# Patient Record
Sex: FEMALE
[De-identification: names, ages, dates, MRNs, and addresses within clinical notes are randomized per-mention and may not be internally consistent; named-entity substitution may affect disease eponyms.]

---

## 2018-11-06 PROBLEM — Z00.00 ENCOUNTER FOR PREVENTIVE HEALTH EXAMINATION: Status: ACTIVE | Noted: 2018-11-06

## 2018-11-08 ENCOUNTER — APPOINTMENT (OUTPATIENT)
Dept: UROLOGY | Facility: CLINIC | Age: 83
End: 2018-11-08
Payer: MEDICARE

## 2018-11-08 VITALS
TEMPERATURE: 98.1 F | WEIGHT: 150 LBS | HEART RATE: 76 BPM | HEIGHT: 63 IN | RESPIRATION RATE: 17 BRPM | SYSTOLIC BLOOD PRESSURE: 190 MMHG | DIASTOLIC BLOOD PRESSURE: 89 MMHG | BODY MASS INDEX: 26.58 KG/M2

## 2018-11-08 DIAGNOSIS — I63.9 CEREBRAL INFARCTION, UNSPECIFIED: ICD-10-CM

## 2018-11-08 DIAGNOSIS — Z80.8 FAMILY HISTORY OF MALIGNANT NEOPLASM OF OTHER ORGANS OR SYSTEMS: ICD-10-CM

## 2018-11-08 DIAGNOSIS — Z85.3 PERSONAL HISTORY OF MALIGNANT NEOPLASM OF BREAST: ICD-10-CM

## 2018-11-08 DIAGNOSIS — Z85.51 PERSONAL HISTORY OF MALIGNANT NEOPLASM OF BLADDER: ICD-10-CM

## 2018-11-08 DIAGNOSIS — Z86.73 PERSONAL HISTORY OF TRANSIENT ISCHEMIC ATTACK (TIA), AND CEREBRAL INFARCTION W/OUT RESIDUAL DEFICITS: ICD-10-CM

## 2018-11-08 DIAGNOSIS — Z80.52 FAMILY HISTORY OF MALIGNANT NEOPLASM OF BLADDER: ICD-10-CM

## 2018-11-08 DIAGNOSIS — Z78.9 OTHER SPECIFIED HEALTH STATUS: ICD-10-CM

## 2018-11-08 DIAGNOSIS — Z80.3 FAMILY HISTORY OF MALIGNANT NEOPLASM OF BREAST: ICD-10-CM

## 2018-11-08 PROCEDURE — 99204 OFFICE O/P NEW MOD 45 MIN: CPT | Mod: 25

## 2018-11-08 PROCEDURE — 51798 US URINE CAPACITY MEASURE: CPT

## 2018-11-09 LAB
APPEARANCE: CLEAR
BACTERIA: ABNORMAL
BILIRUBIN URINE: NEGATIVE
BLOOD URINE: ABNORMAL
COLOR: YELLOW
GLUCOSE QUALITATIVE U: NEGATIVE MG/DL
HYALINE CASTS: 3 /LPF
KETONES URINE: NEGATIVE
LEUKOCYTE ESTERASE URINE: ABNORMAL
MICROSCOPIC-UA: NORMAL
NITRITE URINE: NEGATIVE
PH URINE: 6.5
PROTEIN URINE: 30 MG/DL
RED BLOOD CELLS URINE: 1 /HPF
SPECIFIC GRAVITY URINE: 1.01
SQUAMOUS EPITHELIAL CELLS: 1 /HPF
UROBILINOGEN URINE: NEGATIVE MG/DL
WHITE BLOOD CELLS URINE: 108 /HPF

## 2018-11-12 LAB — BACTERIA UR CULT: ABNORMAL

## 2018-12-17 ENCOUNTER — APPOINTMENT (OUTPATIENT)
Dept: UROLOGY | Facility: CLINIC | Age: 83
End: 2018-12-17

## 2019-01-09 ENCOUNTER — APPOINTMENT (OUTPATIENT)
Dept: UROLOGY | Facility: CLINIC | Age: 84
End: 2019-01-09
Payer: MEDICARE

## 2019-01-09 ENCOUNTER — OUTPATIENT (OUTPATIENT)
Dept: OUTPATIENT SERVICES | Facility: HOSPITAL | Age: 84
LOS: 1 days | End: 2019-01-09
Payer: MEDICARE

## 2019-01-09 VITALS
SYSTOLIC BLOOD PRESSURE: 187 MMHG | BODY MASS INDEX: 28.35 KG/M2 | DIASTOLIC BLOOD PRESSURE: 88 MMHG | HEART RATE: 83 BPM | HEIGHT: 63 IN | WEIGHT: 160 LBS | RESPIRATION RATE: 83 BRPM

## 2019-01-09 DIAGNOSIS — R35.0 FREQUENCY OF MICTURITION: ICD-10-CM

## 2019-01-09 PROCEDURE — 52000 CYSTOURETHROSCOPY: CPT

## 2019-01-09 PROCEDURE — 99214 OFFICE O/P EST MOD 30 MIN: CPT | Mod: 25

## 2019-01-09 PROCEDURE — 51700 IRRIGATION OF BLADDER: CPT | Mod: 59

## 2019-01-14 LAB — BACTERIA UR CULT: ABNORMAL

## 2019-01-14 RX ORDER — TOLTERODINE TARTRATE 2 MG/1
2 CAPSULE, EXTENDED RELEASE ORAL
Qty: 60 | Refills: 4 | Status: COMPLETED | COMMUNITY
Start: 2019-01-09 | End: 2019-01-14

## 2019-01-18 DIAGNOSIS — C67.9 MALIGNANT NEOPLASM OF BLADDER, UNSPECIFIED: ICD-10-CM

## 2019-01-22 LAB
APPEARANCE: ABNORMAL
BACTERIA: ABNORMAL
BILIRUBIN URINE: NEGATIVE
BLOOD URINE: ABNORMAL
COLOR: YELLOW
GLUCOSE QUALITATIVE U: NEGATIVE MG/DL
HYALINE CASTS: 2 /LPF
KETONES URINE: NEGATIVE
LEUKOCYTE ESTERASE URINE: ABNORMAL
MICROSCOPIC-UA: NORMAL
NITRITE URINE: POSITIVE
PH URINE: 6
PROTEIN URINE: NEGATIVE MG/DL
RED BLOOD CELLS URINE: 50 /HPF
SPECIFIC GRAVITY URINE: 1.01
SQUAMOUS EPITHELIAL CELLS: 1 /HPF
UROBILINOGEN URINE: NEGATIVE MG/DL
WHITE BLOOD CELLS URINE: 333 /HPF

## 2019-01-23 ENCOUNTER — MEDICATION RENEWAL (OUTPATIENT)
Age: 84
End: 2019-01-23

## 2019-01-23 ENCOUNTER — APPOINTMENT (OUTPATIENT)
Dept: UROLOGY | Facility: CLINIC | Age: 84
End: 2019-01-23
Payer: MEDICARE

## 2019-01-23 ENCOUNTER — OUTPATIENT (OUTPATIENT)
Dept: OUTPATIENT SERVICES | Facility: HOSPITAL | Age: 84
LOS: 1 days | End: 2019-01-23
Payer: MEDICARE

## 2019-01-23 VITALS
HEART RATE: 66 BPM | HEIGHT: 63 IN | BODY MASS INDEX: 28.35 KG/M2 | WEIGHT: 160 LBS | TEMPERATURE: 97.6 F | SYSTOLIC BLOOD PRESSURE: 178 MMHG | RESPIRATION RATE: 17 BRPM | DIASTOLIC BLOOD PRESSURE: 75 MMHG

## 2019-01-23 VITALS — RESPIRATION RATE: 17 BRPM | HEART RATE: 72 BPM | DIASTOLIC BLOOD PRESSURE: 81 MMHG | SYSTOLIC BLOOD PRESSURE: 177 MMHG

## 2019-01-23 DIAGNOSIS — R35.0 FREQUENCY OF MICTURITION: ICD-10-CM

## 2019-01-23 PROCEDURE — 51700 IRRIGATION OF BLADDER: CPT

## 2019-01-24 DIAGNOSIS — C67.9 MALIGNANT NEOPLASM OF BLADDER, UNSPECIFIED: ICD-10-CM

## 2019-01-30 ENCOUNTER — APPOINTMENT (OUTPATIENT)
Dept: UROLOGY | Facility: CLINIC | Age: 84
End: 2019-01-30
Payer: MEDICARE

## 2019-01-30 ENCOUNTER — MEDICATION RENEWAL (OUTPATIENT)
Age: 84
End: 2019-01-30

## 2019-01-30 ENCOUNTER — OUTPATIENT (OUTPATIENT)
Dept: OUTPATIENT SERVICES | Facility: HOSPITAL | Age: 84
LOS: 1 days | End: 2019-01-30
Payer: MEDICARE

## 2019-01-30 VITALS — HEART RATE: 69 BPM | TEMPERATURE: 97.8 F | SYSTOLIC BLOOD PRESSURE: 180 MMHG | DIASTOLIC BLOOD PRESSURE: 90 MMHG

## 2019-01-30 DIAGNOSIS — R35.0 FREQUENCY OF MICTURITION: ICD-10-CM

## 2019-01-30 PROCEDURE — 51700 IRRIGATION OF BLADDER: CPT

## 2019-01-31 LAB
APPEARANCE: CLEAR
BACTERIA: NEGATIVE
BILIRUBIN URINE: NEGATIVE
BLOOD URINE: NEGATIVE
COLOR: YELLOW
GLUCOSE QUALITATIVE U: NEGATIVE MG/DL
HYALINE CASTS: 1 /LPF
KETONES URINE: NEGATIVE
LEUKOCYTE ESTERASE URINE: NEGATIVE
MICROSCOPIC-UA: NORMAL
NITRITE URINE: NEGATIVE
PH URINE: 6.5
PROTEIN URINE: NEGATIVE MG/DL
RED BLOOD CELLS URINE: 0 /HPF
SPECIFIC GRAVITY URINE: 1.01
SQUAMOUS EPITHELIAL CELLS: 1 /HPF
UROBILINOGEN URINE: NEGATIVE MG/DL
WHITE BLOOD CELLS URINE: 0 /HPF

## 2019-02-01 DIAGNOSIS — N39.0 URINARY TRACT INFECTION, SITE NOT SPECIFIED: ICD-10-CM

## 2019-02-01 DIAGNOSIS — C67.9 MALIGNANT NEOPLASM OF BLADDER, UNSPECIFIED: ICD-10-CM

## 2019-02-01 LAB — BACTERIA UR CULT: NORMAL

## 2019-02-06 ENCOUNTER — MEDICATION RENEWAL (OUTPATIENT)
Age: 84
End: 2019-02-06

## 2019-02-06 ENCOUNTER — APPOINTMENT (OUTPATIENT)
Dept: UROLOGY | Facility: CLINIC | Age: 84
End: 2019-02-06
Payer: MEDICARE

## 2019-02-06 ENCOUNTER — OUTPATIENT (OUTPATIENT)
Dept: OUTPATIENT SERVICES | Facility: HOSPITAL | Age: 84
LOS: 1 days | End: 2019-02-06
Payer: MEDICARE

## 2019-02-06 VITALS
SYSTOLIC BLOOD PRESSURE: 129 MMHG | RESPIRATION RATE: 17 BRPM | TEMPERATURE: 98 F | HEART RATE: 70 BPM | DIASTOLIC BLOOD PRESSURE: 71 MMHG

## 2019-02-06 VITALS
TEMPERATURE: 98 F | DIASTOLIC BLOOD PRESSURE: 81 MMHG | HEART RATE: 70 BPM | SYSTOLIC BLOOD PRESSURE: 186 MMHG | RESPIRATION RATE: 17 BRPM

## 2019-02-06 DIAGNOSIS — R35.0 FREQUENCY OF MICTURITION: ICD-10-CM

## 2019-02-06 PROCEDURE — 99211 OFF/OP EST MAY X REQ PHY/QHP: CPT | Mod: 25

## 2019-02-06 PROCEDURE — 51700 IRRIGATION OF BLADDER: CPT

## 2019-02-06 RX ORDER — METHENAMINE, SODIUM PHOSPHATE, MONOBASIC, MONOHYDRATE, PHENYL SALICYLATE, METHYLENE BLUE, AND HYOSCYAMINE SULFATE 118; 40.8; 36; 10; .12 MG/1; MG/1; MG/1; MG/1; MG/1
118 CAPSULE ORAL
Qty: 120 | Refills: 2 | Status: DISCONTINUED | COMMUNITY
Start: 2018-11-08 | End: 2019-02-06

## 2019-02-06 RX ORDER — OXYBUTYNIN CHLORIDE 5 MG/1
5 TABLET, EXTENDED RELEASE ORAL DAILY
Qty: 30 | Refills: 1 | Status: DISCONTINUED | COMMUNITY
Start: 2019-01-10 | End: 2019-02-06

## 2019-02-07 DIAGNOSIS — C67.9 MALIGNANT NEOPLASM OF BLADDER, UNSPECIFIED: ICD-10-CM

## 2019-02-07 DIAGNOSIS — N39.41 URGE INCONTINENCE: ICD-10-CM

## 2019-02-07 LAB
ALBUMIN SERPL ELPH-MCNC: 4.3 G/DL
ALP BLD-CCNC: 116 U/L
ALT SERPL-CCNC: 19 U/L
ANION GAP SERPL CALC-SCNC: 17 MMOL/L
AST SERPL-CCNC: 21 U/L
BILIRUB SERPL-MCNC: 0.8 MG/DL
BUN SERPL-MCNC: 27 MG/DL
CALCIUM SERPL-MCNC: 10.3 MG/DL
CHLORIDE SERPL-SCNC: 101 MMOL/L
CO2 SERPL-SCNC: 28 MMOL/L
CREAT SERPL-MCNC: 1.66 MG/DL
GLUCOSE SERPL-MCNC: 127 MG/DL
POTASSIUM SERPL-SCNC: 4.2 MMOL/L
PROT SERPL-MCNC: 6.9 G/DL
SODIUM SERPL-SCNC: 146 MMOL/L

## 2019-02-11 ENCOUNTER — APPOINTMENT (OUTPATIENT)
Dept: UROLOGY | Facility: CLINIC | Age: 84
End: 2019-02-11
Payer: MEDICARE

## 2019-02-11 ENCOUNTER — OUTPATIENT (OUTPATIENT)
Dept: OUTPATIENT SERVICES | Facility: HOSPITAL | Age: 84
LOS: 1 days | End: 2019-02-11
Payer: MEDICARE

## 2019-02-11 VITALS
BODY MASS INDEX: 28.35 KG/M2 | RESPIRATION RATE: 17 BRPM | HEART RATE: 79 BPM | TEMPERATURE: 98.4 F | SYSTOLIC BLOOD PRESSURE: 169 MMHG | WEIGHT: 160 LBS | HEIGHT: 63 IN | DIASTOLIC BLOOD PRESSURE: 80 MMHG

## 2019-02-11 DIAGNOSIS — R35.0 FREQUENCY OF MICTURITION: ICD-10-CM

## 2019-02-11 PROCEDURE — 99211 OFF/OP EST MAY X REQ PHY/QHP: CPT | Mod: 25

## 2019-02-11 PROCEDURE — 51700 IRRIGATION OF BLADDER: CPT

## 2019-02-12 DIAGNOSIS — N39.41 URGE INCONTINENCE: ICD-10-CM

## 2019-02-12 DIAGNOSIS — C67.9 MALIGNANT NEOPLASM OF BLADDER, UNSPECIFIED: ICD-10-CM

## 2019-02-12 LAB
APPEARANCE: CLEAR
BACTERIA: NEGATIVE
BILIRUBIN URINE: NEGATIVE
BLOOD URINE: NEGATIVE
COLOR: ABNORMAL
GLUCOSE QUALITATIVE U: NEGATIVE MG/DL
HYALINE CASTS: 3 /LPF
KETONES URINE: NEGATIVE
LEUKOCYTE ESTERASE URINE: ABNORMAL
MICROSCOPIC-UA: NORMAL
NITRITE URINE: POSITIVE
PH URINE: 7
PROTEIN URINE: NEGATIVE MG/DL
RED BLOOD CELLS URINE: 3 /HPF
SPECIFIC GRAVITY URINE: 1.01
SQUAMOUS EPITHELIAL CELLS: 4 /HPF
UROBILINOGEN URINE: 1 MG/DL
WHITE BLOOD CELLS URINE: 11 /HPF

## 2019-02-14 LAB — BACTERIA UR CULT: ABNORMAL

## 2019-02-20 ENCOUNTER — OUTPATIENT (OUTPATIENT)
Dept: OUTPATIENT SERVICES | Facility: HOSPITAL | Age: 84
LOS: 1 days | End: 2019-02-20
Payer: MEDICARE

## 2019-02-20 ENCOUNTER — APPOINTMENT (OUTPATIENT)
Dept: UROLOGY | Facility: CLINIC | Age: 84
End: 2019-02-20
Payer: MEDICARE

## 2019-02-20 VITALS
DIASTOLIC BLOOD PRESSURE: 68 MMHG | TEMPERATURE: 98.1 F | SYSTOLIC BLOOD PRESSURE: 182 MMHG | RESPIRATION RATE: 16 BRPM | HEART RATE: 79 BPM

## 2019-02-20 VITALS — DIASTOLIC BLOOD PRESSURE: 82 MMHG | SYSTOLIC BLOOD PRESSURE: 189 MMHG | HEART RATE: 82 BPM | RESPIRATION RATE: 16 BRPM

## 2019-02-20 DIAGNOSIS — R35.0 FREQUENCY OF MICTURITION: ICD-10-CM

## 2019-02-20 DIAGNOSIS — N39.41 URGE INCONTINENCE: ICD-10-CM

## 2019-02-20 DIAGNOSIS — C67.9 MALIGNANT NEOPLASM OF BLADDER, UNSPECIFIED: ICD-10-CM

## 2019-02-20 PROCEDURE — 51700 IRRIGATION OF BLADDER: CPT

## 2019-02-27 ENCOUNTER — APPOINTMENT (OUTPATIENT)
Dept: UROLOGY | Facility: CLINIC | Age: 84
End: 2019-02-27
Payer: MEDICARE

## 2019-02-27 ENCOUNTER — OUTPATIENT (OUTPATIENT)
Dept: OUTPATIENT SERVICES | Facility: HOSPITAL | Age: 84
LOS: 1 days | End: 2019-02-27
Payer: MEDICARE

## 2019-02-27 VITALS — DIASTOLIC BLOOD PRESSURE: 73 MMHG | TEMPERATURE: 97.4 F | HEART RATE: 68 BPM | SYSTOLIC BLOOD PRESSURE: 175 MMHG

## 2019-02-27 VITALS
RESPIRATION RATE: 16 BRPM | BODY MASS INDEX: 28.35 KG/M2 | DIASTOLIC BLOOD PRESSURE: 77 MMHG | HEART RATE: 79 BPM | HEIGHT: 63 IN | TEMPERATURE: 97.7 F | WEIGHT: 160 LBS | SYSTOLIC BLOOD PRESSURE: 186 MMHG

## 2019-02-27 DIAGNOSIS — R35.0 FREQUENCY OF MICTURITION: ICD-10-CM

## 2019-02-27 PROCEDURE — 51700 IRRIGATION OF BLADDER: CPT

## 2019-02-28 LAB
APPEARANCE: CLEAR
BACTERIA: NEGATIVE
BILIRUBIN URINE: NEGATIVE
BLOOD URINE: NEGATIVE
COLOR: COLORLESS
GLUCOSE QUALITATIVE U: NEGATIVE
HYALINE CASTS: 0 /LPF
KETONES URINE: NEGATIVE
LEUKOCYTE ESTERASE URINE: NEGATIVE
MICROSCOPIC-UA: NORMAL
NITRITE URINE: NEGATIVE
PH URINE: 7
PROTEIN URINE: NEGATIVE
RED BLOOD CELLS URINE: 0 /HPF
SPECIFIC GRAVITY URINE: 1.01
SQUAMOUS EPITHELIAL CELLS: 1 /HPF
UROBILINOGEN URINE: NORMAL
WHITE BLOOD CELLS URINE: 0 /HPF

## 2019-03-01 DIAGNOSIS — N39.0 URINARY TRACT INFECTION, SITE NOT SPECIFIED: ICD-10-CM

## 2019-03-01 LAB — BACTERIA UR CULT: NORMAL

## 2019-03-04 ENCOUNTER — APPOINTMENT (OUTPATIENT)
Dept: UROLOGY | Facility: CLINIC | Age: 84
End: 2019-03-04
Payer: MEDICARE

## 2019-03-04 ENCOUNTER — OUTPATIENT (OUTPATIENT)
Dept: OUTPATIENT SERVICES | Facility: HOSPITAL | Age: 84
LOS: 1 days | End: 2019-03-04
Payer: MEDICARE

## 2019-03-04 VITALS
SYSTOLIC BLOOD PRESSURE: 174 MMHG | TEMPERATURE: 97.8 F | DIASTOLIC BLOOD PRESSURE: 84 MMHG | RESPIRATION RATE: 16 BRPM | HEART RATE: 76 BPM

## 2019-03-04 VITALS — DIASTOLIC BLOOD PRESSURE: 83 MMHG | HEART RATE: 71 BPM | SYSTOLIC BLOOD PRESSURE: 172 MMHG | RESPIRATION RATE: 16 BRPM

## 2019-03-04 DIAGNOSIS — R35.0 FREQUENCY OF MICTURITION: ICD-10-CM

## 2019-03-04 PROCEDURE — 99213 OFFICE O/P EST LOW 20 MIN: CPT | Mod: 25

## 2019-03-04 PROCEDURE — 51700 IRRIGATION OF BLADDER: CPT

## 2019-03-15 ENCOUNTER — APPOINTMENT (OUTPATIENT)
Dept: UROLOGY | Facility: CLINIC | Age: 84
End: 2019-03-15

## 2019-03-19 DIAGNOSIS — N39.41 URGE INCONTINENCE: ICD-10-CM

## 2019-03-19 DIAGNOSIS — N39.0 URINARY TRACT INFECTION, SITE NOT SPECIFIED: ICD-10-CM

## 2019-03-21 ENCOUNTER — APPOINTMENT (OUTPATIENT)
Dept: UROLOGY | Facility: CLINIC | Age: 84
End: 2019-03-21

## 2019-03-22 ENCOUNTER — APPOINTMENT (OUTPATIENT)
Dept: UROLOGY | Facility: CLINIC | Age: 84
End: 2019-03-22
Payer: MEDICARE

## 2019-03-22 ENCOUNTER — OUTPATIENT (OUTPATIENT)
Dept: OUTPATIENT SERVICES | Facility: HOSPITAL | Age: 84
LOS: 1 days | End: 2019-03-22
Payer: MEDICARE

## 2019-03-22 DIAGNOSIS — R35.0 FREQUENCY OF MICTURITION: ICD-10-CM

## 2019-03-22 PROCEDURE — 51797 INTRAABDOMINAL PRESSURE TEST: CPT

## 2019-03-22 PROCEDURE — 51728 CYSTOMETROGRAM W/VP: CPT | Mod: 26

## 2019-03-22 PROCEDURE — 99214 OFFICE O/P EST MOD 30 MIN: CPT | Mod: 25

## 2019-03-22 PROCEDURE — 51797 INTRAABDOMINAL PRESSURE TEST: CPT | Mod: 26

## 2019-03-22 PROCEDURE — 51728 CYSTOMETROGRAM W/VP: CPT

## 2019-03-22 PROCEDURE — 51784 ANAL/URINARY MUSCLE STUDY: CPT | Mod: 26

## 2019-03-22 PROCEDURE — 51784 ANAL/URINARY MUSCLE STUDY: CPT

## 2019-03-22 PROCEDURE — 51798 US URINE CAPACITY MEASURE: CPT | Mod: 59

## 2019-03-22 PROCEDURE — 51741 ELECTRO-UROFLOWMETRY FIRST: CPT | Mod: 26

## 2019-03-22 PROCEDURE — 51741 ELECTRO-UROFLOWMETRY FIRST: CPT

## 2019-03-27 DIAGNOSIS — N39.41 URGE INCONTINENCE: ICD-10-CM

## 2019-03-27 DIAGNOSIS — C67.9 MALIGNANT NEOPLASM OF BLADDER, UNSPECIFIED: ICD-10-CM

## 2019-04-03 ENCOUNTER — OUTPATIENT (OUTPATIENT)
Dept: OUTPATIENT SERVICES | Facility: HOSPITAL | Age: 84
LOS: 1 days | End: 2019-04-03
Payer: MEDICARE

## 2019-04-03 ENCOUNTER — APPOINTMENT (OUTPATIENT)
Dept: UROLOGY | Facility: CLINIC | Age: 84
End: 2019-04-03
Payer: MEDICARE

## 2019-04-03 VITALS — SYSTOLIC BLOOD PRESSURE: 165 MMHG | DIASTOLIC BLOOD PRESSURE: 79 MMHG | HEART RATE: 85 BPM

## 2019-04-03 VITALS — SYSTOLIC BLOOD PRESSURE: 174 MMHG | HEART RATE: 78 BPM | TEMPERATURE: 97.4 F | DIASTOLIC BLOOD PRESSURE: 79 MMHG

## 2019-04-03 DIAGNOSIS — R35.0 FREQUENCY OF MICTURITION: ICD-10-CM

## 2019-04-03 PROCEDURE — 51700 IRRIGATION OF BLADDER: CPT

## 2019-04-05 DIAGNOSIS — C67.9 MALIGNANT NEOPLASM OF BLADDER, UNSPECIFIED: ICD-10-CM

## 2019-04-11 ENCOUNTER — OUTPATIENT (OUTPATIENT)
Dept: OUTPATIENT SERVICES | Facility: HOSPITAL | Age: 84
LOS: 1 days | End: 2019-04-11
Payer: MEDICARE

## 2019-04-11 ENCOUNTER — APPOINTMENT (OUTPATIENT)
Dept: UROLOGY | Facility: CLINIC | Age: 84
End: 2019-04-11
Payer: MEDICARE

## 2019-04-11 ENCOUNTER — MEDICATION RENEWAL (OUTPATIENT)
Age: 84
End: 2019-04-11

## 2019-04-11 VITALS — SYSTOLIC BLOOD PRESSURE: 148 MMHG | HEART RATE: 85 BPM | RESPIRATION RATE: 16 BRPM | DIASTOLIC BLOOD PRESSURE: 73 MMHG

## 2019-04-11 VITALS
TEMPERATURE: 97.8 F | SYSTOLIC BLOOD PRESSURE: 146 MMHG | HEART RATE: 89 BPM | RESPIRATION RATE: 16 BRPM | DIASTOLIC BLOOD PRESSURE: 75 MMHG

## 2019-04-11 DIAGNOSIS — R35.0 FREQUENCY OF MICTURITION: ICD-10-CM

## 2019-04-11 PROCEDURE — 51700 IRRIGATION OF BLADDER: CPT

## 2019-04-17 ENCOUNTER — MEDICATION RENEWAL (OUTPATIENT)
Age: 84
End: 2019-04-17

## 2019-04-17 ENCOUNTER — APPOINTMENT (OUTPATIENT)
Dept: UROLOGY | Facility: CLINIC | Age: 84
End: 2019-04-17
Payer: MEDICARE

## 2019-04-17 ENCOUNTER — OUTPATIENT (OUTPATIENT)
Dept: OUTPATIENT SERVICES | Facility: HOSPITAL | Age: 84
LOS: 1 days | End: 2019-04-17
Payer: MEDICARE

## 2019-04-17 VITALS
RESPIRATION RATE: 16 BRPM | WEIGHT: 160 LBS | DIASTOLIC BLOOD PRESSURE: 78 MMHG | SYSTOLIC BLOOD PRESSURE: 168 MMHG | BODY MASS INDEX: 28.35 KG/M2 | HEART RATE: 83 BPM | HEIGHT: 63 IN

## 2019-04-17 VITALS
DIASTOLIC BLOOD PRESSURE: 79 MMHG | TEMPERATURE: 97.1 F | RESPIRATION RATE: 16 BRPM | HEART RATE: 71 BPM | SYSTOLIC BLOOD PRESSURE: 175 MMHG

## 2019-04-17 DIAGNOSIS — R35.0 FREQUENCY OF MICTURITION: ICD-10-CM

## 2019-04-17 PROCEDURE — 99213 OFFICE O/P EST LOW 20 MIN: CPT | Mod: 25

## 2019-04-17 PROCEDURE — 51700 IRRIGATION OF BLADDER: CPT

## 2019-04-18 DIAGNOSIS — N39.41 URGE INCONTINENCE: ICD-10-CM

## 2019-04-18 LAB
APPEARANCE: CLEAR
BACTERIA: ABNORMAL
BILIRUBIN URINE: NEGATIVE
BLOOD URINE: NEGATIVE
COLOR: COLORLESS
GLUCOSE QUALITATIVE U: NEGATIVE
HYALINE CASTS: 0 /LPF
KETONES URINE: NEGATIVE
LEUKOCYTE ESTERASE URINE: ABNORMAL
MICROSCOPIC-UA: NORMAL
NITRITE URINE: NEGATIVE
PH URINE: 7
PROTEIN URINE: NORMAL
RED BLOOD CELLS URINE: 3 /HPF
SPECIFIC GRAVITY URINE: 1.01
SQUAMOUS EPITHELIAL CELLS: 1 /HPF
UROBILINOGEN URINE: NORMAL
WHITE BLOOD CELLS URINE: 50 /HPF

## 2019-04-22 ENCOUNTER — APPOINTMENT (OUTPATIENT)
Dept: UROLOGY | Facility: CLINIC | Age: 84
End: 2019-04-22
Payer: MEDICARE

## 2019-04-22 ENCOUNTER — MEDICATION RENEWAL (OUTPATIENT)
Age: 84
End: 2019-04-22

## 2019-04-22 ENCOUNTER — OUTPATIENT (OUTPATIENT)
Dept: OUTPATIENT SERVICES | Facility: HOSPITAL | Age: 84
LOS: 1 days | End: 2019-04-22
Payer: MEDICARE

## 2019-04-22 VITALS — TEMPERATURE: 97.7 F | SYSTOLIC BLOOD PRESSURE: 169 MMHG | DIASTOLIC BLOOD PRESSURE: 76 MMHG | HEART RATE: 86 BPM

## 2019-04-22 VITALS — DIASTOLIC BLOOD PRESSURE: 78 MMHG | HEART RATE: 86 BPM | SYSTOLIC BLOOD PRESSURE: 158 MMHG | RESPIRATION RATE: 16 BRPM

## 2019-04-22 DIAGNOSIS — R35.0 FREQUENCY OF MICTURITION: ICD-10-CM

## 2019-04-22 LAB — BACTERIA UR CULT: ABNORMAL

## 2019-04-22 PROCEDURE — 51700 IRRIGATION OF BLADDER: CPT

## 2019-04-24 DIAGNOSIS — N39.0 URINARY TRACT INFECTION, SITE NOT SPECIFIED: ICD-10-CM

## 2019-04-24 DIAGNOSIS — N39.41 URGE INCONTINENCE: ICD-10-CM

## 2019-04-26 DIAGNOSIS — N39.0 URINARY TRACT INFECTION, SITE NOT SPECIFIED: ICD-10-CM

## 2019-04-26 DIAGNOSIS — N39.41 URGE INCONTINENCE: ICD-10-CM

## 2019-05-02 ENCOUNTER — APPOINTMENT (OUTPATIENT)
Dept: UROLOGY | Facility: CLINIC | Age: 84
End: 2019-05-02
Payer: MEDICARE

## 2019-05-02 ENCOUNTER — MEDICATION RENEWAL (OUTPATIENT)
Age: 84
End: 2019-05-02

## 2019-05-02 ENCOUNTER — OUTPATIENT (OUTPATIENT)
Dept: OUTPATIENT SERVICES | Facility: HOSPITAL | Age: 84
LOS: 1 days | End: 2019-05-02
Payer: MEDICARE

## 2019-05-02 VITALS
SYSTOLIC BLOOD PRESSURE: 147 MMHG | TEMPERATURE: 98 F | BODY MASS INDEX: 28.35 KG/M2 | WEIGHT: 160 LBS | HEART RATE: 82 BPM | RESPIRATION RATE: 18 BRPM | DIASTOLIC BLOOD PRESSURE: 76 MMHG | HEIGHT: 63 IN

## 2019-05-02 DIAGNOSIS — R35.0 FREQUENCY OF MICTURITION: ICD-10-CM

## 2019-05-02 PROCEDURE — 51700 IRRIGATION OF BLADDER: CPT

## 2019-05-03 ENCOUNTER — APPOINTMENT (OUTPATIENT)
Dept: UROLOGY | Facility: CLINIC | Age: 84
End: 2019-05-03

## 2019-05-08 ENCOUNTER — APPOINTMENT (OUTPATIENT)
Dept: UROLOGY | Facility: CLINIC | Age: 84
End: 2019-05-08

## 2019-05-08 ENCOUNTER — MEDICATION RENEWAL (OUTPATIENT)
Age: 84
End: 2019-05-08

## 2019-05-08 LAB
ALBUMIN SERPL ELPH-MCNC: 4.1 G/DL
ALP BLD-CCNC: 146 U/L
ALT SERPL-CCNC: 22 U/L
ANION GAP SERPL CALC-SCNC: 12 MMOL/L
AST SERPL-CCNC: 19 U/L
BILIRUB SERPL-MCNC: 0.6 MG/DL
BUN SERPL-MCNC: 37 MG/DL
CALCIUM SERPL-MCNC: 9.6 MG/DL
CHLORIDE SERPL-SCNC: 98 MMOL/L
CO2 SERPL-SCNC: 30 MMOL/L
CREAT SERPL-MCNC: 1.68 MG/DL
GLUCOSE SERPL-MCNC: 178 MG/DL
POTASSIUM SERPL-SCNC: 3.7 MMOL/L
PROT SERPL-MCNC: 6.6 G/DL
SODIUM SERPL-SCNC: 140 MMOL/L

## 2019-06-19 ENCOUNTER — OUTPATIENT (OUTPATIENT)
Dept: OUTPATIENT SERVICES | Facility: HOSPITAL | Age: 84
LOS: 1 days | End: 2019-06-19
Payer: MEDICARE

## 2019-06-19 ENCOUNTER — APPOINTMENT (OUTPATIENT)
Dept: UROLOGY | Facility: CLINIC | Age: 84
End: 2019-06-19
Payer: MEDICARE

## 2019-06-19 VITALS
RESPIRATION RATE: 16 BRPM | DIASTOLIC BLOOD PRESSURE: 71 MMHG | TEMPERATURE: 98.3 F | HEART RATE: 93 BPM | SYSTOLIC BLOOD PRESSURE: 166 MMHG

## 2019-06-19 DIAGNOSIS — R35.0 FREQUENCY OF MICTURITION: ICD-10-CM

## 2019-06-19 PROCEDURE — 52000 CYSTOURETHROSCOPY: CPT

## 2019-06-19 PROCEDURE — 99214 OFFICE O/P EST MOD 30 MIN: CPT | Mod: 25

## 2019-06-19 RX ORDER — CONJUGATED ESTROGENS 0.62 MG/G
0.62 CREAM VAGINAL
Qty: 4 | Refills: 2 | Status: ACTIVE | COMMUNITY
Start: 2019-06-19 | End: 1900-01-01

## 2019-06-19 RX ORDER — NORTRIPTYLINE HYDROCHLORIDE 10 MG/1
10 CAPSULE ORAL
Qty: 180 | Refills: 3 | Status: DISCONTINUED | COMMUNITY
Start: 2019-03-22 | End: 2019-06-19

## 2019-06-20 LAB
APPEARANCE: CLEAR
BACTERIA: NEGATIVE
BILIRUBIN URINE: NEGATIVE
BLOOD URINE: NEGATIVE
COLOR: COLORLESS
GLUCOSE QUALITATIVE U: NEGATIVE
HYALINE CASTS: 0 /LPF
KETONES URINE: NEGATIVE
LEUKOCYTE ESTERASE URINE: ABNORMAL
MICROSCOPIC-UA: NORMAL
NITRITE URINE: NEGATIVE
PH URINE: 6
PROTEIN URINE: NEGATIVE
RED BLOOD CELLS URINE: 4 /HPF
SPECIFIC GRAVITY URINE: 1
SQUAMOUS EPITHELIAL CELLS: 1 /HPF
UROBILINOGEN URINE: NORMAL
WHITE BLOOD CELLS URINE: 47 /HPF

## 2019-06-22 DIAGNOSIS — C67.9 MALIGNANT NEOPLASM OF BLADDER, UNSPECIFIED: ICD-10-CM

## 2019-06-22 DIAGNOSIS — N31.9 NEUROMUSCULAR DYSFUNCTION OF BLADDER, UNSPECIFIED: ICD-10-CM

## 2019-06-22 DIAGNOSIS — N39.41 URGE INCONTINENCE: ICD-10-CM

## 2019-07-17 LAB
BACTERIA UR CULT: NORMAL
URINE CYTOLOGY: NORMAL

## 2019-07-29 ENCOUNTER — APPOINTMENT (OUTPATIENT)
Dept: UROLOGY | Facility: CLINIC | Age: 84
End: 2019-07-29
Payer: MEDICARE

## 2019-07-29 PROCEDURE — 99213 OFFICE O/P EST LOW 20 MIN: CPT | Mod: 25

## 2019-07-29 PROCEDURE — 51798 US URINE CAPACITY MEASURE: CPT

## 2019-07-30 LAB
APPEARANCE: ABNORMAL
BACTERIA: NEGATIVE
BILIRUBIN URINE: NEGATIVE
BLOOD URINE: NORMAL
COLOR: NORMAL
GLUCOSE QUALITATIVE U: NEGATIVE
HYALINE CASTS: 0 /LPF
KETONES URINE: NEGATIVE
LEUKOCYTE ESTERASE URINE: ABNORMAL
MICROSCOPIC-UA: NORMAL
NITRITE URINE: NEGATIVE
PH URINE: 6.5
PROTEIN URINE: NEGATIVE
RED BLOOD CELLS URINE: 3 /HPF
SPECIFIC GRAVITY URINE: 1
SQUAMOUS EPITHELIAL CELLS: 3 /HPF
UROBILINOGEN URINE: NORMAL
WHITE BLOOD CELLS URINE: 172 /HPF

## 2019-08-05 LAB — BACTERIA UR CULT: ABNORMAL

## 2019-08-15 LAB — FUNGUS SPEC CULT ORG #8: NORMAL

## 2019-09-23 ENCOUNTER — MEDICATION RENEWAL (OUTPATIENT)
Age: 84
End: 2019-09-23

## 2019-12-12 ENCOUNTER — APPOINTMENT (OUTPATIENT)
Dept: UROLOGY | Facility: CLINIC | Age: 84
End: 2019-12-12
Payer: MEDICARE

## 2019-12-12 DIAGNOSIS — N39.3 STRESS INCONTINENCE (FEMALE) (MALE): ICD-10-CM

## 2019-12-12 DIAGNOSIS — Z87.440 PERSONAL HISTORY OF URINARY (TRACT) INFECTIONS: ICD-10-CM

## 2019-12-12 PROCEDURE — 99215 OFFICE O/P EST HI 40 MIN: CPT

## 2019-12-12 RX ORDER — DOXYCYCLINE 100 MG/1
100 CAPSULE ORAL DAILY
Qty: 60 | Refills: 1 | Status: ACTIVE | COMMUNITY
Start: 2019-12-12 | End: 1900-01-01

## 2019-12-13 LAB
APPEARANCE: CLEAR
BACTERIA: NEGATIVE
BILIRUBIN URINE: NEGATIVE
BLOOD URINE: NEGATIVE
COLOR: NORMAL
GLUCOSE QUALITATIVE U: NEGATIVE
HYALINE CASTS: 1 /LPF
KETONES URINE: NEGATIVE
LEUKOCYTE ESTERASE URINE: NEGATIVE
MICROSCOPIC-UA: NORMAL
NITRITE URINE: NEGATIVE
PH URINE: 6
PROTEIN URINE: NORMAL
RED BLOOD CELLS URINE: 1 /HPF
SPECIFIC GRAVITY URINE: 1.01
SQUAMOUS EPITHELIAL CELLS: 5 /HPF
UROBILINOGEN URINE: NORMAL
WHITE BLOOD CELLS URINE: 3 /HPF

## 2019-12-14 LAB
ALBUMIN SERPL ELPH-MCNC: 4.2 G/DL
ALP BLD-CCNC: 138 U/L
ALT SERPL-CCNC: 21 U/L
ANION GAP SERPL CALC-SCNC: 16 MMOL/L
AST SERPL-CCNC: 19 U/L
BACTERIA UR CULT: NORMAL
BILIRUB SERPL-MCNC: 0.5 MG/DL
BUN SERPL-MCNC: 45 MG/DL
CALCIUM SERPL-MCNC: 9.9 MG/DL
CHLORIDE SERPL-SCNC: 102 MMOL/L
CO2 SERPL-SCNC: 27 MMOL/L
CREAT SERPL-MCNC: 1.62 MG/DL
GLUCOSE SERPL-MCNC: 216 MG/DL
POTASSIUM SERPL-SCNC: 3.9 MMOL/L
PROT SERPL-MCNC: 7 G/DL
SODIUM SERPL-SCNC: 145 MMOL/L

## 2019-12-18 ENCOUNTER — APPOINTMENT (OUTPATIENT)
Dept: UROLOGY | Facility: CLINIC | Age: 84
End: 2019-12-18
Payer: MEDICARE

## 2019-12-18 ENCOUNTER — OUTPATIENT (OUTPATIENT)
Dept: OUTPATIENT SERVICES | Facility: HOSPITAL | Age: 84
LOS: 1 days | End: 2019-12-18
Payer: MEDICARE

## 2019-12-18 DIAGNOSIS — C67.9 MALIGNANT NEOPLASM OF BLADDER, UNSPECIFIED: ICD-10-CM

## 2019-12-18 DIAGNOSIS — R35.0 FREQUENCY OF MICTURITION: ICD-10-CM

## 2019-12-18 DIAGNOSIS — N39.41 URGE INCONTINENCE: ICD-10-CM

## 2019-12-18 PROCEDURE — 52000 CYSTOURETHROSCOPY: CPT

## 2019-12-18 PROCEDURE — 51700 IRRIGATION OF BLADDER: CPT | Mod: 59

## 2019-12-18 PROCEDURE — 99213 OFFICE O/P EST LOW 20 MIN: CPT | Mod: 25

## 2019-12-26 LAB — URINE CYTOLOGY: NORMAL

## 2019-12-30 DIAGNOSIS — C67.9 MALIGNANT NEOPLASM OF BLADDER, UNSPECIFIED: ICD-10-CM

## 2019-12-30 DIAGNOSIS — N39.41 URGE INCONTINENCE: ICD-10-CM

## 2020-01-02 ENCOUNTER — RESULT REVIEW (OUTPATIENT)
Age: 85
End: 2020-01-02

## 2020-01-22 ENCOUNTER — CLINICAL ADVICE (OUTPATIENT)
Age: 85
End: 2020-01-22

## 2020-01-30 ENCOUNTER — CLINICAL ADVICE (OUTPATIENT)
Age: 85
End: 2020-01-30

## 2020-03-05 ENCOUNTER — APPOINTMENT (OUTPATIENT)
Dept: UROLOGY | Facility: CLINIC | Age: 85
End: 2020-03-05
Payer: MEDICARE

## 2020-03-05 DIAGNOSIS — N31.9 NEUROMUSCULAR DYSFUNCTION OF BLADDER, UNSPECIFIED: ICD-10-CM

## 2020-03-05 PROCEDURE — 99214 OFFICE O/P EST MOD 30 MIN: CPT

## 2020-03-05 RX ORDER — CIPROFLOXACIN HYDROCHLORIDE 500 MG/1
500 TABLET, FILM COATED ORAL
Qty: 14 | Refills: 0 | Status: DISCONTINUED | COMMUNITY
Start: 2020-01-30 | End: 2020-03-05

## 2020-03-05 RX ORDER — PHENAZOPYRIDINE HYDROCHLORIDE 200 MG/1
200 TABLET ORAL
Qty: 20 | Refills: 1 | Status: ACTIVE | COMMUNITY
Start: 2019-02-06 | End: 1900-01-01

## 2020-03-05 RX ORDER — AMITRIPTYLINE HYDROCHLORIDE 25 MG/1
25 TABLET, FILM COATED ORAL
Qty: 60 | Refills: 4 | Status: DISCONTINUED | COMMUNITY
Start: 2019-06-19 | End: 2020-03-05

## 2020-03-05 RX ORDER — CIPROFLOXACIN HYDROCHLORIDE 250 MG/1
250 TABLET, FILM COATED ORAL
Qty: 14 | Refills: 0 | Status: DISCONTINUED | COMMUNITY
Start: 2019-01-14 | End: 2020-03-05

## 2020-03-05 RX ORDER — PREGABALIN 100 MG/1
100 CAPSULE ORAL AT BEDTIME
Qty: 30 | Refills: 0 | Status: DISCONTINUED | COMMUNITY
Start: 2019-09-23 | End: 2020-03-05

## 2020-03-06 LAB
APPEARANCE: CLEAR
BACTERIA: ABNORMAL
BILIRUBIN URINE: NEGATIVE
BLOOD URINE: NEGATIVE
COLOR: COLORLESS
GLUCOSE QUALITATIVE U: NEGATIVE
HYALINE CASTS: 0 /LPF
KETONES URINE: NEGATIVE
LEUKOCYTE ESTERASE URINE: ABNORMAL
MICROSCOPIC-UA: NORMAL
NITRITE URINE: NEGATIVE
PH URINE: 6.5
PROTEIN URINE: NEGATIVE
RED BLOOD CELLS URINE: 1 /HPF
SPECIFIC GRAVITY URINE: 1.01
SQUAMOUS EPITHELIAL CELLS: 1 /HPF
UROBILINOGEN URINE: NORMAL
WHITE BLOOD CELLS URINE: 33 /HPF

## 2020-03-09 LAB — BACTERIA UR CULT: ABNORMAL

## 2020-03-09 RX ORDER — CIPROFLOXACIN HYDROCHLORIDE 500 MG/1
500 TABLET, FILM COATED ORAL
Qty: 14 | Refills: 0 | Status: ACTIVE | COMMUNITY
Start: 2020-03-09 | End: 1900-01-01

## 2020-04-02 RX ORDER — MIRABEGRON 50 MG/1
50 TABLET, FILM COATED, EXTENDED RELEASE ORAL
Qty: 30 | Refills: 5 | Status: ACTIVE | COMMUNITY
Start: 2019-07-29 | End: 1900-01-01

## 2020-06-18 ENCOUNTER — APPOINTMENT (OUTPATIENT)
Dept: UROLOGY | Facility: CLINIC | Age: 85
End: 2020-06-18

## 2020-12-21 PROBLEM — Z87.440 HISTORY OF URINARY TRACT INFECTION: Status: RESOLVED | Noted: 2019-01-14 | Resolved: 2020-12-21

## 2021-06-29 ENCOUNTER — NON-APPOINTMENT (OUTPATIENT)
Age: 86
End: 2021-06-29

## 2023-02-09 ENCOUNTER — APPOINTMENT (OUTPATIENT)
Dept: UROLOGY | Facility: CLINIC | Age: 88
End: 2023-02-09
Payer: MEDICARE

## 2023-02-09 ENCOUNTER — APPOINTMENT (OUTPATIENT)
Age: 88
End: 2023-02-09

## 2023-02-09 VITALS
WEIGHT: 140 LBS | DIASTOLIC BLOOD PRESSURE: 84 MMHG | RESPIRATION RATE: 17 BRPM | SYSTOLIC BLOOD PRESSURE: 178 MMHG | TEMPERATURE: 98 F | BODY MASS INDEX: 24.8 KG/M2 | OXYGEN SATURATION: 97 % | HEIGHT: 63 IN | HEART RATE: 86 BPM

## 2023-02-09 DIAGNOSIS — R39.89 OTHER SYMPTOMS AND SIGNS INVOLVING THE GENITOURINARY SYSTEM: ICD-10-CM

## 2023-02-09 DIAGNOSIS — R10.2 PELVIC AND PERINEAL PAIN: ICD-10-CM

## 2023-02-09 DIAGNOSIS — R35.0 FREQUENCY OF MICTURITION: ICD-10-CM

## 2023-02-09 PROCEDURE — 99215 OFFICE O/P EST HI 40 MIN: CPT

## 2023-02-10 NOTE — LETTER BODY
[Dear  ___] : Dear  [unfilled], [Courtesy Letter:] : I had the pleasure of seeing your patient, [unfilled], in my office today. [Please see my note below.] : Please see my note below. [Consult Closing:] : Thank you very much for allowing me to participate in the care of this patient.  If you have any questions, please do not hesitate to contact me. [Sincerely,] : Sincerely, [FreeTextEntry3] : Mickey Davis MD\par System Director Urogynecology/[facility]\par Department of Urology\par [facility] \par   at [facility]\par  of Urology\par [facility] [employer] at [facility]\par

## 2023-02-10 NOTE — PHYSICAL EXAM
[General Appearance - Well Developed] : well developed [General Appearance - Well Nourished] : well nourished [Normal Appearance] : normal appearance [Well Groomed] : well groomed [General Appearance - In No Acute Distress] : no acute distress [] : no respiratory distress [Oriented To Time, Place, And Person] : oriented to person, place, and time [FreeTextEntry1] : wheelchair bound

## 2023-02-10 NOTE — ASSESSMENT
[FreeTextEntry1] : \par \par Impression/plan: 89 year-old female with hx of bladder CA s/o surgery and chemo within longstanding vaginal pain. \par \par 1. Will contact Dr. Rojas's office and obtain recent office and procedures notes. \par 2. Will speak with Dr. Mccray and have patient f/u with him for further care. \par \par I, Dr. Mickey Davis, personally performed the evaluation and management (E/M) services for this established patient who presents today with (a) new problem(s)/exacerbation of (an) existing condition(s).  That E/M includes conducting the clinically appropriate interval history &/or exam, assessing all new/exacerbated conditions, and establishing a new plan of care.  Today, my BARBI Horace Barbosa, was here to observe &/or participate in the visit & follow plan of care established by me.\par \par

## 2023-02-10 NOTE — HISTORY OF PRESENT ILLNESS
[FreeTextEntry1] : 89 year-old female, previously seen by Dr. Mccray before the pandemic, presents to the office with c/o of terrible pain in the vagina. Patient is accompanied by her grand daughter, Heather and kp. She has been having this pain for many years now and has had many different treatments for it with Dr. Mccray. Patient states that she was told that there is nothing else that can be done for her. Grand daughter reports that the patient's pain goes away when she drinks water and her bladder starts to fill up.\par \par She does have a hx of bladder Ca for which she had surgery 5-6 years ago and chemo treatments afterwards. She is followed by Dr. Ugalde. Of recent, she went to see Dr. Rojas ([phone]) and had a procedure performed (biopsy?) and was told she was cancer free. \par Patient was taken to the ER a couple of times within the past couple of weeks from an increase in her pain and difficulty breathing. \par \par Daytime frequency of going to the bathroom every 1.5 to 2 hours. She is incontinent as well and goes through 9-10 diapers a day. She denies hx of UTIs, hematuria, and stones. She reports drinking at least 35 oz of water, 1 cup of coffee, and 1 cup of tea a day. \par \par \par \par \par \par